# Patient Record
Sex: FEMALE | Race: OTHER | Employment: FULL TIME | URBAN - NONMETROPOLITAN AREA
[De-identification: names, ages, dates, MRNs, and addresses within clinical notes are randomized per-mention and may not be internally consistent; named-entity substitution may affect disease eponyms.]

---

## 2020-12-02 ENCOUNTER — OFFICE VISIT (OUTPATIENT)
Dept: OBGYN CLINIC | Age: 35
End: 2020-12-02
Payer: COMMERCIAL

## 2020-12-02 VITALS
DIASTOLIC BLOOD PRESSURE: 88 MMHG | WEIGHT: 201 LBS | HEIGHT: 62 IN | SYSTOLIC BLOOD PRESSURE: 126 MMHG | BODY MASS INDEX: 36.99 KG/M2

## 2020-12-02 PROCEDURE — 99213 OFFICE O/P EST LOW 20 MIN: CPT | Performed by: NURSE PRACTITIONER

## 2020-12-02 RX ORDER — CEPHALEXIN 500 MG/1
500 CAPSULE ORAL 2 TIMES DAILY
Qty: 14 CAPSULE | Refills: 0 | Status: SHIPPED | OUTPATIENT
Start: 2020-12-02 | End: 2020-12-09

## 2020-12-02 RX ORDER — LAMOTRIGINE 100 MG/1
TABLET ORAL
COMMUNITY
Start: 2020-12-01

## 2020-12-02 NOTE — PROGRESS NOTES
Right: Skin change and tenderness present. No inverted nipple, mass or nipple discharge. Left: No inverted nipple, mass, nipple discharge, skin change or tenderness. Assessment and Plan          Diagnosis Orders   1. Mastodynia of right breast  [facility] DI[facility]NOSTIC W OR WO CAD BILATERAL   2. Local infection of the skin and subcutaneous tissue, unspecified  cephALEXin (KEFLEX) 500 MG capsule     Patient to schedule diagnostic mammogram. Will start antibiotics- discussed should notice improvement of symptoms in 24-48 hours. I have discontinued Marianne Matthews's folic acid. I am also having her start on cephALEXin. Additionally, I am having her maintain her lamoTRIgine. Return if symptoms worsen or fail to improve, for diagnostic mammogram bilateral.    There are no Patient Instructions on file for this visit. Over 50% of time spent on counseling and care coordination on: see assessment and plan,  She was also counseled on her preventative health maintenance recommendations and follow-up.         FF time: 15 min      Thor Jimenez,12/2/2020 2:04 PM

## 2020-12-07 ENCOUNTER — TELEPHONE (OUTPATIENT)
Dept: OBGYN CLINIC | Age: 35
End: 2020-12-07

## 2020-12-07 RX ORDER — SULFAMETHOXAZOLE AND TRIMETHOPRIM 800; 160 MG/1; MG/1
1 TABLET ORAL 2 TIMES DAILY
Qty: 10 TABLET | Refills: 0 | Status: SHIPPED | OUTPATIENT
Start: 2020-12-07 | End: 2020-12-12

## 2020-12-07 NOTE — TELEPHONE ENCOUNTER
Pt calling because she is having side effects from her Keflex and wanted to change it. Having nausea, dizzy, confusion, HA, bloody stool x 1, tired. BR looked up symptoms and bloody stool and confusion are not apart of the side effects. Advised her to contact PCP about the two side effects otherwise per BR ok to send Bactrim DS BIS x 5 days. Will send.

## 2020-12-15 ENCOUNTER — TELEPHONE (OUTPATIENT)
Dept: OBGYN CLINIC | Age: 35
End: 2020-12-15

## 2021-03-17 ENCOUNTER — HOSPITAL ENCOUNTER (OUTPATIENT)
Age: 36
Setting detail: SPECIMEN
Discharge: HOME | End: 2021-03-17
Payer: COMMERCIAL

## 2021-03-17 ENCOUNTER — OFFICE VISIT (OUTPATIENT)
Dept: OBGYN CLINIC | Age: 36
End: 2021-03-17
Payer: COMMERCIAL

## 2021-03-17 VITALS
DIASTOLIC BLOOD PRESSURE: 89 MMHG | HEIGHT: 62 IN | WEIGHT: 201.6 LBS | SYSTOLIC BLOOD PRESSURE: 126 MMHG | BODY MASS INDEX: 37.1 KG/M2

## 2021-03-17 DIAGNOSIS — Z01.419 WOMEN'S ANNUAL ROUTINE GYNECOLOGICAL EXAMINATION: Primary | ICD-10-CM

## 2021-03-17 PROCEDURE — 99395 PREV VISIT EST AGE 18-39: CPT | Performed by: NURSE PRACTITIONER

## 2021-03-17 RX ORDER — FOLIC ACID 1 MG/1
TABLET ORAL
COMMUNITY
Start: 2021-03-13

## 2021-03-17 NOTE — PROGRESS NOTES
YEARLY PHYSICAL    Date of service: 3/17/2021    Li Higgins  Is a 38 y.o.  female    PT's PCP is: Waleska Prince MD     : 1985                                             Subjective:       Patient's last menstrual period was 2021. Are your menses regular: yes    OB History    Para Term  AB Living   2 1 1   1 1   SAB TAB Ectopic Molar Multiple Live Births   1         1      # Outcome Date GA Lbr Douglas/2nd Weight Sex Delivery Anes PTL Lv   2 SAB 2020           1 Term 2013    F CS-LTranv   JAMIA        Social History     Tobacco Use   Smoking Status Never Smoker   Smokeless Tobacco Never Used        Social History     Substance and Sexual Activity   Alcohol Use No       Family History   Problem Relation Age of Onset    Diabetes Paternal Grandfather     Diabetes Maternal Grandmother     Diabetes Father        Any family history of breast or ovarian cancer: No    Any family history of blood clots: No      Allergies: No known allergies      Current Outpatient Medications:     folic acid (FOLVITE) 1 MG tablet, , Disp: , Rfl:     lamoTRIgine (LAMICTAL) 100 MG tablet, , Disp: , Rfl:     Social History     Substance and Sexual Activity   Sexual Activity Yes    Partners: Male       Any bleeding or pain with intercourse: No    Last Yearly:  2019    Last pap: 2016, negative     Last HPV: 2016, negative     Last Mammogram: diagnostic tapan results in care everywhere    Do you do self breast exams: encouraged monthly SBE    Past Medical History:   Diagnosis Date    Abnormal Pap smear     no treatment needed after colpo    Abnormal Pap smear of cervix     H/O human papillomavirus infection     Seizures ([address]     2012 last seizures.  Onset age 30    Uterine fibroid        Past Surgical History:   Procedure Laterality Date     SECTION  2013       Family History   Problem Relation Age of Onset    Diabetes Paternal Grandfather     Diabetes Maternal Grandmother     Diabetes Father        Chief Complaint   Patient presents with    Gynecologic Exam     Pap due. Feeling well, voices no concerns. LMP was 11 days late, still hasn't started yet. Please use virginal speculum. PE:  Vital Signs  Blood pressure 126/89, height 5' 2\" (1.575 m), weight 201 lb 9.6 oz (91.4 kg), last menstrual period 02/03/2021, unknown if currently breastfeeding. Estimated body mass index is 36.87 kg/m² as calculated from the following:    Height as of this encounter: 5' 2\" (1.575 m). Weight as of this encounter: 201 lb 9.6 oz (91.4 kg). HPI: Patient presents today for annual exam. Feeling well. Denies breast/pelvic pain. Reports monthly menses, LMP was 11 days late. Admits to being very stressed. Due for pap. Leaving for vacation in two weeks, meeting husbands family for the first time. Review of Systems   All other systems reviewed and are negative. Objective  Heent:   negative   Cor: regular rate and rhythm, no murmurs              Pul:clear to auscultation bilaterally- no wheezes, rales or rhonchi, normal air movement, no respiratory distress      GI: Abdomen soft, non-tender. BS normal. No masses,  No organomegaly           Extremities: normal strength, tone, and muscle mass, ROM of all joints is normal   Breasts: Breast:normal appearance, no masses or tenderness, No nipple retraction or dimpling, No nipple discharge or bleeding   Pelvic Exam: GENITAL/URINARY:  External Genitalia:  General appearance; normal, Hair distribution; normal, Lesions absent  Urethral Meatus:  Size normal, Location normal, Lesions absent, Prolapse absent  Urethra:   Fullness absent, Masses absent  Bladder:  Fullness absent, Masses absent, Tenderness absent, Cystocele absent  Vagina:  General appearance normal, Estrogen effect normal, Discharge absent, Lesions absent, Pelvic support normal  Cervix:  Unable to fully visualize due to patient not tolerating exam. Pap obtained by blind swab   Uterus:  Size normal, Tenderness absent  Adenexa: Masses absent, Tenderness absent  Anus/Perineum:  Lesions absent and Masses absent                                    Vaginal discharge: no vaginal discharge                                                  Assessment and Plan          Diagnosis Orders   1. Women's annual routine gynecological examination  PAP SMEAR       patient will continue to monitor cycles, this was first incidence of menses being late      I am having fitaborate maintain her lamoTRIgine and folic acid. Return in about 1 year (around 3/17/2022) for yearly. She was also counseled on her preventative health maintenance recommendations and follow-up. There are no Patient Instructions on file for this visit.     Tamra Jimenez,3/17/2021 10:13 AM

## 2021-03-19 LAB
HPV SAMPLE: NORMAL
HPV, GENOTYPE 16: NOT DETECTED
HPV, GENOTYPE 18: NOT DETECTED
HPV, HIGH RISK OTHER: NOT DETECTED
HPV, INTERPRETATION: NORMAL
SPECIMEN DESCRIPTION: NORMAL

## 2021-03-24 LAB — CYTOLOGY REPORT: NORMAL

## 2021-03-29 ENCOUNTER — OFFICE VISIT (OUTPATIENT)
Dept: OBGYN CLINIC | Age: 36
End: 2021-03-29
Payer: COMMERCIAL

## 2021-03-29 ENCOUNTER — HOSPITAL ENCOUNTER (OUTPATIENT)
Age: 36
Setting detail: SPECIMEN
Discharge: HOME | End: 2021-03-29
Payer: COMMERCIAL

## 2021-03-29 VITALS
DIASTOLIC BLOOD PRESSURE: 80 MMHG | BODY MASS INDEX: 40.25 KG/M2 | SYSTOLIC BLOOD PRESSURE: 120 MMHG | WEIGHT: 205 LBS | HEIGHT: 60 IN

## 2021-03-29 DIAGNOSIS — N76.1 SUBACUTE VAGINITIS: Primary | ICD-10-CM

## 2021-03-29 DIAGNOSIS — B37.9 CANDIDIASIS: ICD-10-CM

## 2021-03-29 DIAGNOSIS — N76.1 SUBACUTE VAGINITIS: ICD-10-CM

## 2021-03-29 LAB
BILIRUBIN, POC: NORMAL
BLOOD URINE, POC: NORMAL
CLARITY, POC: NORMAL
COLOR, POC: YELLOW
DIRECT EXAM: NORMAL
GLUCOSE URINE, POC: NORMAL
KETONES, POC: NORMAL
LEUKOCYTE EST, POC: NORMAL
Lab: NORMAL
NITRITE, POC: NORMAL
PH, POC: 7.5
PROTEIN, POC: NORMAL
SPECIFIC GRAVITY, POC: 1.01
SPECIMEN DESCRIPTION: NORMAL
UROBILINOGEN, POC: NORMAL

## 2021-03-29 PROCEDURE — 99213 OFFICE O/P EST LOW 20 MIN: CPT | Performed by: ADVANCED PRACTICE MIDWIFE

## 2021-03-29 PROCEDURE — 81002 URINALYSIS NONAUTO W/O SCOPE: CPT | Performed by: ADVANCED PRACTICE MIDWIFE

## 2021-03-29 RX ORDER — FLUCONAZOLE 150 MG/1
150 TABLET ORAL EVERY OTHER DAY
Qty: 3 TABLET | Refills: 0 | Status: SHIPPED | OUTPATIENT
Start: 2021-03-29

## 2021-03-29 ASSESSMENT — ENCOUNTER SYMPTOMS: GASTROINTESTINAL NEGATIVE: 1

## 2021-03-29 NOTE — PROGRESS NOTES
PROBLEM VISIT     Date of service: 3/29/2021    Scooter Arredondo  Is a 38 y.o.  female    PT's PCP is: Nida Louie MD     : 1985                                          Review of Systems   Constitutional: Negative. HENT: Negative. Gastrointestinal: Negative. Genitourinary: Positive for vaginal discharge (Vaginal discharge with itching). Negative for difficulty urinating, dysuria and pelvic pain. Musculoskeletal: Negative. Neurological: Negative. No LMP recorded. OB History    Para Term  AB Living   2 1 1   1 1   SAB TAB Ectopic Molar Multiple Live Births   1         1      # Outcome Date GA Lbr Douglas/2nd Weight Sex Delivery Anes PTL Lv   2 SAB 2020           1 Term 2013    F CS-LTranv   JAMIA        Social History     Tobacco Use   Smoking Status Never Smoker   Smokeless Tobacco Never Used        Social History     Substance and Sexual Activity   Alcohol Use No       Allergies: Keflex [cephalexin]      Current Outpatient Medications:     fluconazole (DIFLUCAN) 150 MG tablet, Take 1 tablet by mouth every other day, Disp: 3 tablet, Rfl: 0    folic acid (FOLVITE) 1 MG tablet, , Disp: , Rfl:     lamoTRIgine (LAMICTAL) 100 MG tablet, , Disp: , Rfl:     Social History     Substance and Sexual Activity   Sexual Activity Yes    Partners: Male       Chief Complaint   Patient presents with    Vaginal Discharge     Pt c/o itch and burning for past few days.  Urinary Tract Infection     Pt called telehealth doctor and they prescribed her nitrofurantoin and pt is all finished with it. Physical Exam  Constitutional:       Appearance: Normal appearance. She is obese. Genitourinary:      Pelvic exam was performed with patient in the lithotomy position. Vulva, urethra and cervix normal.      Vaginal discharge, erythema and tenderness present. HENT:      Head: Normocephalic. Eyes:      Pupils: Pupils are equal, round, and reactive to light. Neck:      Musculoskeletal: Normal range of motion. Pulmonary:      Effort: Pulmonary effort is normal.   Musculoskeletal: Normal range of motion. Neurological:      Mental Status: She is alert and oriented to person, place, and time. Skin:     General: Skin is warm and dry. Vitals signs and nursing note reviewed. Vital Signs  Blood pressure 120/80, height 5' (1.524 m), weight 205 lb (93 kg), unknown if currently breastfeeding. HPI Recently treated for UTI with macrobid and now having vaginal itching and discharge. Denies any new laundry/shower products and partners. Assessment and Plan          Diagnosis Orders   1. Subacute vaginitis  VAGINITIS DNA PROBE   2. Candidiasis       Discussed negative dipstick in office today therefore not sending for culture    Discussed yeast on exam - will treat with Diflucan x3 doses. I am having Karyle Chol start on fluconazole. I am also having her maintain her lamoTRIgine and folic acid. Return if symptoms worsen or fail to improve, for Yearly. She was also counseled on her preventative health maintenance recommendations and follow-up. There are no Patient Instructions on file for this visit.     Vu WESTBROOK,3/29/2021 3:11 PM                   Electronically signed by RODOLFO Lozano CNM